# Patient Record
Sex: MALE | Race: WHITE | URBAN - METROPOLITAN AREA
[De-identification: names, ages, dates, MRNs, and addresses within clinical notes are randomized per-mention and may not be internally consistent; named-entity substitution may affect disease eponyms.]

---

## 2017-06-09 ENCOUNTER — OFFICE VISIT (OUTPATIENT)
Dept: FAMILY MEDICINE | Facility: CLINIC | Age: 22
End: 2017-06-09
Payer: COMMERCIAL

## 2017-06-09 VITALS
WEIGHT: 185 LBS | HEIGHT: 71 IN | HEART RATE: 68 BPM | SYSTOLIC BLOOD PRESSURE: 128 MMHG | BODY MASS INDEX: 25.9 KG/M2 | DIASTOLIC BLOOD PRESSURE: 78 MMHG | TEMPERATURE: 98.2 F

## 2017-06-09 DIAGNOSIS — J30.2 SEASONAL ALLERGIC RHINITIS, UNSPECIFIED ALLERGIC RHINITIS TRIGGER: Primary | ICD-10-CM

## 2017-06-09 PROCEDURE — 99213 OFFICE O/P EST LOW 20 MIN: CPT | Performed by: PHYSICIAN ASSISTANT

## 2017-06-09 RX ORDER — FLUTICASONE PROPIONATE 50 MCG
1-2 SPRAY, SUSPENSION (ML) NASAL DAILY
Qty: 1 BOTTLE | Refills: 11 | Status: SHIPPED | OUTPATIENT
Start: 2017-06-09

## 2017-06-09 NOTE — MR AVS SNAPSHOT
"              After Visit Summary   6/9/2017    Malvin Odell    MRN: [id number]           Patient Information     Date Of Birth          1995        Visit Information        Provider Department      6/9/2017 8:00 AM Renny Jameson PA-C [facility]        Today's Diagnoses     Seasonal allergic rhinitis, unspecified allergic rhinitis trigger    -  1      Care Instructions    1. Neti-Pot? Helps reduce allergen exposure if not tolerating, you can do nasal saline in the shower (buy over the counter)   2. Consider taking nasal steroid starting early spring (April) and take until end of July. Do every morning.           Follow-ups after your visit        Who to contact     If you have questions or need follow up information about today's clinic visit or your schedule please contact [facility] directly at [phone].  Normal or non-critical lab and imaging results will be communicated to you by MyChart, letter or phone within 4 business days after the clinic has received the results. If you do not hear from us within 7 days, please contact the clinic through [facility] or phone. If you have a critical or abnormal lab result, we will notify you by phone as soon as possible.  Submit refill requests through [employer] or call your pharmacy and they will forward the refill request to us. Please allow 3 business days for your refill to be completed.          Additional Information About Your Visit        MyChart Information     [employer] lets you send messages to your doctor, view your test results, renew your prescriptions, schedule appointments and more. To sign up, go to [email or web address] . Click on \"Log in\" on the left side of the screen, which will take you to the Welcome page. Then click on \"Sign up Now\" on the right side of the page.     You will be asked to enter the access code listed below, as well as some personal information. Please follow the directions " "to create your username and password.     Your access code is: [email or web address]  Expires: 2017  8:35 AM     Your access code will  in 90 days. If you need help or a new code, please call your [place] clinic or [phone].        Care EveryWhere ID     This is your Care EveryWhere ID. This could be used by other organizations to access your [place] medical records  [id number]        Your Vitals Were     Pulse Temperature Height BMI (Body Mass Index)          68 98.2  F (36.8  C) (Oral) 5' 10.5\" (1.791 m) 26.17 kg/m2         Blood Pressure from Last 3 Encounters:   17 128/78   14 110/62   08/24/10 104/66    Weight from Last 3 Encounters:   17 185 lb (83.9 kg)   14 151 lb 6.4 oz (68.7 kg) (52 %)*   08/24/10 122 lb (55.3 kg) (49 %)*     * Growth percentiles are based on [facility] 2-20 Years data.              Today, you had the following     No orders found for display         Today's Medication Changes          These changes are accurate as of: 17  8:35 AM.  If you have any questions, ask your nurse or doctor.               Start taking these medicines.        Dose/Directions    fluticasone 50 MCG/ACT spray   Commonly known as:  FLONASE   Used for:  Seasonal allergic rhinitis, unspecified allergic rhinitis trigger   Started by:  Renny Jameson PA-C        Dose:  1-2 spray   Spray 1-2 sprays into both nostrils daily   Quantity:  1 Bottle   Refills:  11            Where to get your medicines      These medications were sent to [facility]/pharmacy #[id number] - [place], [place] - [address]  [address], [place] [address]     Phone:  [phone]     fluticasone 50 MCG/ACT spray                Primary Care Provider Office Phone # Fax #    Renny Jameson PA-C [phone]       [facility] [address] [address]        Thank you!     Thank you for choosing [facility]  for " your care. Our goal is always to provide you with excellent care. Hearing back from our patients is one way we can continue to improve our services. Please take a few minutes to complete the written survey that you may receive in the mail after your visit with us. Thank you!             Your Updated Medication List - Protect others around you: Learn how to safely use, store and throw away your medicines at www.disposemymeds.org.          This list is accurate as of: 6/9/17  8:35 AM.  Always use your most recent med list.                   Brand Name Dispense Instructions for use    fluticasone 50 MCG/ACT spray    FLONASE    1 Bottle    Spray 1-2 sprays into both nostrils daily       NO ACTIVE MEDICATIONS      .

## 2017-06-09 NOTE — PATIENT INSTRUCTIONS
1. Neti-Pot? Helps reduce allergen exposure if not tolerating, you can do nasal saline in the shower (buy over the counter)   2. Consider taking nasal steroid starting early spring (April) and take until end of July. Do every morning.

## 2017-06-09 NOTE — NURSING NOTE
"Chief Complaint   Patient presents with     Allergies       Initial /78 (Cuff Size: Adult Large)  Pulse 68  Temp 98.2  F (36.8  C) (Oral)  Ht 5' 10.5\" (1.791 m)  Wt 185 lb (83.9 kg)  BMI 26.17 kg/m2 Estimated body mass index is 26.17 kg/(m^2) as calculated from the following:    Height as of this encounter: 5' 10.5\" (1.791 m).    Weight as of this encounter: 185 lb (83.9 kg).  Medication Reconciliation: complete   Kerry Choi, Certified Medical Assistant (AAMA)     "

## 2017-06-09 NOTE — PROGRESS NOTES
"  SUBJECTIVE:                                                    Malvin Odell is a 21 year old male who presents to clinic today for the following health issues:      ALLERGIES     Onset: late spring    Description:   Nasal congestion: YES  Sneezing: YES  Red, itchy eyes: no    Progression of Symptoms:  same    Accompanying Signs & Symptoms:  Cough: no  Wheezing: no  Rash: no  Sinus/facial pain: no   History:   Is it seasonal: in the spring and in the summer   History of Asthma: no  Has allergy testing been done: no    Precipitating factors:   Spring and early summer    Alleviating factors:  Staying indoors       Therapies Tried and outcome: Claritin - no relief, OTC allergies meds - no relief, zyrtec - no relief    Allergies - every spring. Mostly nasal. Congestion and drainage. Rest of the year is fine. Denies other symptoms.    There is no problem list on file for this patient.     Current Outpatient Prescriptions   Medication     fluticasone (FLONASE) 50 MCG/ACT spray     NO ACTIVE MEDICATIONS     No current facility-administered medications for this visit.       Problem list and histories reviewed & adjusted, as indicated.  Additional history: as documented    Labs reviewed in EPIC    Reviewed and updated as needed this visit by clinical staff  Tobacco  Allergies  Med Hx  Surg Hx  Fam Hx  Soc Hx      Reviewed and updated as needed this visit by Provider         ROS:  Constitutional, HEENT, cardiovascular, pulmonary, gi and gu systems are negative, except as otherwise noted.    OBJECTIVE:                                                    /78 (Cuff Size: Adult Large)  Pulse 68  Temp 98.2  F (36.8  C) (Oral)  Ht 5' 10.5\" (1.791 m)  Wt 185 lb (83.9 kg)  BMI 26.17 kg/m2  Body mass index is 26.17 kg/(m^2).  GENERAL: healthy, alert and no distress  EYES: Eyes grossly normal to inspection, PERRL and conjunctivae and sclerae normal  HENT: Nasal turbinate inflammation, posterior " oropharyngeal erythema, otherwise ear canals and TM's normal, nose and mouth without ulcers or lesions  NECK: no adenopathy, no asymmetry, masses, or scars and thyroid normal to palpation  RESP: lungs clear to auscultation - no rales, rhonchi or wheezes  CV: regular rate and rhythm, normal S1 S2, no S3 or S4, no murmur, click or rub, no peripheral edema and peripheral pulses strong       ASSESSMENT/PLAN:                                                      (J30.2) Seasonal allergic rhinitis, unspecified allergic rhinitis trigger  (primary encounter diagnosis)  Comment:   Plan: fluticasone (FLONASE) 50 MCG/ACT spray        Will treat with topical as noted. Recommend Neti-Pot and nasal saline rinses, etc.    CIRA DIXON PA-C  [facility]